# Patient Record
Sex: FEMALE | Race: AMERICAN INDIAN OR ALASKA NATIVE
[De-identification: names, ages, dates, MRNs, and addresses within clinical notes are randomized per-mention and may not be internally consistent; named-entity substitution may affect disease eponyms.]

---

## 2019-03-09 ENCOUNTER — HOSPITAL ENCOUNTER (EMERGENCY)
Dept: HOSPITAL 5 - ED | Age: 49
Discharge: HOME | End: 2019-03-09
Payer: MEDICAID

## 2019-03-09 VITALS — DIASTOLIC BLOOD PRESSURE: 105 MMHG | SYSTOLIC BLOOD PRESSURE: 142 MMHG

## 2019-03-09 DIAGNOSIS — M25.531: Primary | ICD-10-CM

## 2019-03-09 DIAGNOSIS — E78.00: ICD-10-CM

## 2019-03-09 DIAGNOSIS — J45.909: ICD-10-CM

## 2019-03-09 DIAGNOSIS — E11.9: ICD-10-CM

## 2019-03-09 DIAGNOSIS — I10: ICD-10-CM

## 2019-03-09 LAB
BUN SERPL-MCNC: 8 MG/DL (ref 7–17)
BUN/CREAT SERPL: 13 %
CALCIUM SERPL-MCNC: 9.4 MG/DL (ref 8.4–10.2)
HCT VFR BLD CALC: 40.2 % (ref 30.3–42.9)
HEMOLYSIS INDEX: 44
HGB BLD-MCNC: 13.3 GM/DL (ref 10.1–14.3)
MCHC RBC AUTO-ENTMCNC: 33 % (ref 30–34)
MCV RBC AUTO: 85 FL (ref 79–97)
PLATELET # BLD: 246 K/MM3 (ref 140–440)
RBC # BLD AUTO: 4.72 M/MM3 (ref 3.65–5.03)

## 2019-03-09 PROCEDURE — 96372 THER/PROPH/DIAG INJ SC/IM: CPT

## 2019-03-09 PROCEDURE — 80048 BASIC METABOLIC PNL TOTAL CA: CPT

## 2019-03-09 PROCEDURE — 85027 COMPLETE CBC AUTOMATED: CPT

## 2019-03-09 PROCEDURE — 99283 EMERGENCY DEPT VISIT LOW MDM: CPT

## 2019-03-09 PROCEDURE — 36415 COLL VENOUS BLD VENIPUNCTURE: CPT

## 2019-03-09 PROCEDURE — 73110 X-RAY EXAM OF WRIST: CPT

## 2019-03-09 NOTE — XRAY REPORT
EXAM:    XR WRIST 3+V RT 

 

HISTORY:  PAIN WRIST 

 

TECHNIQUE: 3 views 

 

COMPARISON: None available. 

 

FINDINGS:  

 

There is no acute bony fracture, or joint subluxation or dislocation seen. No evidence for inflammato
ry or degenerative arthritis is seen. No focal bone erosion or sclerosis is seen.  No soft tissue emp
hysema, radiodense soft tissue abnormality or foreign body is seen. 

 

IMPRESSION:  

 

1.  No acute bony fracture, or joint subluxation or dislocation seen. 

 

2.  No soft tissue emphysema, radiodense soft tissue abnormality or foreign body seen. 

 

  

 

This document is electronically signed by Willie Rivera MD., March 9 2019 12:42:15 PM ET

## 2019-03-09 NOTE — EMERGENCY DEPARTMENT REPORT
Chief Complaint: Extremity Problem,Nontraumatic


Stated Complaint: RT HAND SWOLLEN


Time Seen by Provider: 03/09/19 11:41





- HPI


History of Present Illness: 





PMH


HTN


HPLD


ASTHMA


DEPRESSION


DM





RX


WILL GET LIST OF MEDS





BS - DID NOT DO THIS AM





PCP- [facility]





PS


CSECTIONS


POST MENOPAUSAL





CIG NONE


ETOH NONE


DRUGS NONE





CO R HAND SWELLING. STARTED LAST PM BEFORE BED; NO HX DVT'S; NO FALL/TRAUMA


R HANDED





HOUSE WIFE 





BP ELEVATED


NO ANKLE SWELLING


NO CP OR SOB





MSE COMPLETED





MSE screening note: 


Focused history and physical exam performed.


Due to findings the following was ordered:











ED Disposition for MSE


Condition: Stable

## 2019-03-09 NOTE — EMERGENCY DEPARTMENT REPORT
ED Extremity Problem HPI





- General


Chief complaint: Extremity Problem,Nontraumatic


Stated complaint: RT HAND SWOLLEN


Time Seen by Provider: 19 11:41


Source: patient


Mode of arrival: Ambulatory


Limitations: No Limitations





- History of Present Illness


Initial comments: 





Patient is a 48-year-old female with history of hypertension and diabetes.  

Patient presented to the ER complaining of right wrist swelling and pain since 

yesterday.  Patient stated that she went to bed last might was no any problem 

and she will come in the middle of the night with right wrist swelling and 

decreased range of motion.  Patient denied any injury.  No fever or chills 

recently.


MD Complaint: joint swelling, joint paint


-: Last night


Location: right, upper extremity


History of Same: No


Severity scale (0 -10): 8


Consistency: constant


Associated Symptoms: denies other symptoms





- Related Data


                                    Allergies











Allergy/AdvReac Type Severity Reaction Status Date / Time


 


No Known Allergies Allergy   Unverified 19 11:33














ED Review of Systems


ROS: 


Stated complaint: RT HAND SWOLLEN


Other details as noted in HPI





Comment: All other systems reviewed and negative


Constitutional: denies: chills, fever


Respiratory: denies: cough, orthopnea, shortness of breath, SOB with exertion


Cardiovascular: denies: chest pain


Gastrointestinal: denies: abdominal pain, nausea, vomiting, diarrhea, he

matemesis


Musculoskeletal: joint swelling, arthralgia


Neurological: denies: headache, weakness





ED Past Medical Hx





- Past Medical History


Hx Hypertension: Yes


Hx Diabetes: Yes


Hx Asthma: Yes


Additional medical history: high cholesterol





- Surgical History


Past Surgical History?: Yes


Additional Surgical History: C section.





- Social History


Smoking Status: Never Smoker


Substance Use Type: None





ED Physical Exam





- General


Limitations: No Limitations


General appearance: alert, in no apparent distress, anxious





- Head


Head exam: Present: atraumatic, normocephalic, normal inspection





- Eye


Eye exam: Present: normal appearance





- ENT


ENT exam: Present: normal exam, normal orophraynx, mucous membranes moist





- Neck


Neck exam: Present: normal inspection, full ROM.  Absent: tenderness, 

meningismus, lymphadenopathy, thyromegaly





- Respiratory


Respiratory exam: Present: normal lung sounds bilaterally.  Absent: respiratory 

distress, wheezes, rales, rhonchi, stridor, chest wall tenderness, accessory mu

scle use, decreased breath sounds, prolonged expiratory





- Cardiovascular


Cardiovascular Exam: Present: regular rate, normal rhythm, normal heart sounds





- GI/Abdominal


GI/Abdominal exam: Present: soft, normal bowel sounds.  Absent: distended, 

tenderness, guarding, rebound, rigid, pulsatile mass





- Expanded Upper Extremity Exam


  ** Right


General: Present: normal inspection


Shoulder Exam: Present: full ROM.  Absent: tenderness


Upper Arm exam: Present: normal inspection, full ROM.  Absent: tenderness


Elbow exam: Present: normal inspection, full ROM.  Absent: tenderness, swelling,

abrasion


Forearm Wrist exam: Present: tenderness, swelling.  Absent: abrasion, laceratio

n, ecchymosis, deformity, crepidus, dislocation, erythema, tenderness over 

anatomical snuff box, pain with axial thumb loading


Neuro motor exam: Present: wrist extension intact, thumb opposition intact, 

thumb IP flexion intact, thumb adduction intact, fingers 2-5 abduction intact


Neurosensory exam: Present: 2-point discrimination, radial nerve intact, ulnar 

nerve intact, median nerve intact


Vascular: Present: normal capillary refill





- Back Exam


Back exam: Present: normal inspection, full ROM.  Absent: CVA tenderness (R), 

CVA tenderness (L), muscle spasm, paraspinal tenderness, vertebral tenderness





- Neurological Exam


Neurological exam: Present: alert, oriented X3, CN II-XII intact, normal gait, 

reflexes normal





- Psychiatric


Psychiatric exam: Present: normal mood





- Skin


Skin exam: Present: warm, intact, normal color





ED Course





                                   Vital Signs











  19





  11:41


 


Temperature 98.2 F


 


Pulse Rate 101 H


 


Respiratory 20





Rate 


 


Blood Pressure 142/105


 


O2 Sat by Pulse 97





Oximetry 














ED Medical Decision Making





- Lab Data


Result diagrams: 


                                 19 12:07





                                 19 12:07





- Radiology Data


Radiology results: report reviewed





Referring Physician:   LUCRECIA BOWER


Patient Name:   WILLIAM SALMERON


Patient ID:   [id number]


YOB: 1970


Sex:   Female


Accession:   [id number]


Report Date:   2019


Report Status:   Finalized


Findings


[facility] 


[address] [place] [address] 





XRay Report 


Signed 





Patient: WILLIAM SALMERON MR#: [id number] 





: 1970 Acct:[id number] 





Age/Sex: 48 / F ADM Date: 19 





Loc: ED 


Attending Dr: 








Ordering Physician: LUCRECIA BOWER 


Date of Service: 19 


Procedure(s): XR wrist 3+V RT 


Accession Number(s): [id number] 





cc: LUCRECIA BOWER 





Fluoro Time In Minutes: 





EXAM: XR WRIST 3+V RT 





HISTORY: PAIN WRIST 





TECHNIQUE: 3 views 





COMPARISON: None available. 





FINDINGS: 





There is no acute bony fracture, or joint subluxation or dislocation seen. No 

evidence for 


inflammatory or degenerative arthritis is seen. No focal bone erosion or 

sclerosis is seen. No soft


tissue emphysema, radiodense soft tissue abnormality or foreign body is seen. 





IMPRESSION: 





1. No acute bony fracture, or joint subluxation or dislocation seen. 





2. No soft tissue emphysema, radiodense soft tissue abnormality or foreign body 

seen. 











This document is electronically signed by Shiva Rivera MD., 2019 

12:42:15 PM ET 





Transcribed By: [facility] 


Dictated By: SHIVA RIVERA 


Electronically Authenticated By: SHIVA RIVERA 


Signed Date/Time: 19 1244 











DD/DT: 19 1229 


TD/TT: 19 1229





- Medical Decision Making





Patient is a 48-year-old with right wrist swelling and pain since last night.  

X-ray of the right wrist is negative for acute finding.  Labs reviewed and is 

negative also.  No clinical evidence or laboratory evidence of infection.  I 

believe patient symptoms is most likely inflammatory in origin.  Patient 

received Toradol in the emergency room.  Patient will be discharged home with 

Naprosyn and tramadol.  I advised the patient to follow-up with her primary care

physician in the next 2-3 days and to return to the ER if symptoms are not 

improved.


Critical care attestation.: 


If time is entered above; I have spent that time in minutes in the direct care 

of this critically ill patient, excluding procedure time.








ED Disposition


Clinical Impression: 


 Wrist pain, acute





Disposition: -01 TO HOME OR SELFCARE


Is pt being admited?: No


Condition: Stable


Instructions:  Arthralgia (ED)


Referrals: 


[facility], [place] [Primary Care Provider] - 3-5 Days